# Patient Record
Sex: MALE | Race: WHITE | NOT HISPANIC OR LATINO
[De-identification: names, ages, dates, MRNs, and addresses within clinical notes are randomized per-mention and may not be internally consistent; named-entity substitution may affect disease eponyms.]

---
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
## 2021-08-19 ENCOUNTER — TRANSCRIPTION ENCOUNTER (OUTPATIENT)
Age: 21
End: 2021-08-19

## 2024-05-07 ENCOUNTER — EMERGENCY (EMERGENCY)
Facility: HOSPITAL | Age: 24
LOS: 1 days | Discharge: PSYCHIATRIC OR SUBSTANCE USE FACILITY | End: 2024-05-07
Attending: EMERGENCY MEDICINE | Admitting: EMERGENCY MEDICINE
Payer: COMMERCIAL

## 2024-05-07 VITALS
SYSTOLIC BLOOD PRESSURE: 146 MMHG | WEIGHT: 164.91 LBS | HEART RATE: 92 BPM | DIASTOLIC BLOOD PRESSURE: 86 MMHG | RESPIRATION RATE: 18 BRPM | TEMPERATURE: 98 F | OXYGEN SATURATION: 100 % | HEIGHT: 72 IN

## 2024-05-07 DIAGNOSIS — F31.9 BIPOLAR DISORDER, UNSPECIFIED: ICD-10-CM

## 2024-05-07 LAB
ALBUMIN SERPL ELPH-MCNC: 4.4 G/DL — SIGNIFICANT CHANGE UP (ref 3.3–5)
ALP SERPL-CCNC: 61 U/L — SIGNIFICANT CHANGE UP (ref 30–120)
ALT FLD-CCNC: 67 U/L — HIGH (ref 10–60)
AMPHET UR-MCNC: NEGATIVE — SIGNIFICANT CHANGE UP
ANION GAP SERPL CALC-SCNC: 8 MMOL/L — SIGNIFICANT CHANGE UP (ref 5–17)
APAP SERPL-MCNC: <1 UG/ML — LOW (ref 10–30)
AST SERPL-CCNC: 31 U/L — SIGNIFICANT CHANGE UP (ref 10–40)
BARBITURATES UR SCN-MCNC: NEGATIVE — SIGNIFICANT CHANGE UP
BASOPHILS # BLD AUTO: 0.03 K/UL — SIGNIFICANT CHANGE UP (ref 0–0.2)
BASOPHILS NFR BLD AUTO: 0.6 % — SIGNIFICANT CHANGE UP (ref 0–2)
BENZODIAZ UR-MCNC: NEGATIVE — SIGNIFICANT CHANGE UP
BILIRUB SERPL-MCNC: 0.9 MG/DL — SIGNIFICANT CHANGE UP (ref 0.2–1.2)
BUN SERPL-MCNC: 12 MG/DL — SIGNIFICANT CHANGE UP (ref 7–23)
CALCIUM SERPL-MCNC: 9.5 MG/DL — SIGNIFICANT CHANGE UP (ref 8.4–10.5)
CHLORIDE SERPL-SCNC: 105 MMOL/L — SIGNIFICANT CHANGE UP (ref 96–108)
CO2 SERPL-SCNC: 28 MMOL/L — SIGNIFICANT CHANGE UP (ref 22–31)
COCAINE METAB.OTHER UR-MCNC: NEGATIVE — SIGNIFICANT CHANGE UP
CREAT SERPL-MCNC: 1.04 MG/DL — SIGNIFICANT CHANGE UP (ref 0.5–1.3)
EGFR: 103 ML/MIN/1.73M2 — SIGNIFICANT CHANGE UP
EOSINOPHIL # BLD AUTO: 0.03 K/UL — SIGNIFICANT CHANGE UP (ref 0–0.5)
EOSINOPHIL NFR BLD AUTO: 0.6 % — SIGNIFICANT CHANGE UP (ref 0–6)
ETHANOL SERPL-MCNC: <3 MG/DL — SIGNIFICANT CHANGE UP (ref 0–3)
GLUCOSE SERPL-MCNC: 122 MG/DL — HIGH (ref 70–99)
HCT VFR BLD CALC: 43.3 % — SIGNIFICANT CHANGE UP (ref 39–50)
HGB BLD-MCNC: 14.3 G/DL — SIGNIFICANT CHANGE UP (ref 13–17)
IMM GRANULOCYTES NFR BLD AUTO: 0.2 % — SIGNIFICANT CHANGE UP (ref 0–0.9)
LYMPHOCYTES # BLD AUTO: 1.81 K/UL — SIGNIFICANT CHANGE UP (ref 1–3.3)
LYMPHOCYTES # BLD AUTO: 34.7 % — SIGNIFICANT CHANGE UP (ref 13–44)
MCHC RBC-ENTMCNC: 28.5 PG — SIGNIFICANT CHANGE UP (ref 27–34)
MCHC RBC-ENTMCNC: 33 GM/DL — SIGNIFICANT CHANGE UP (ref 32–36)
MCV RBC AUTO: 86.4 FL — SIGNIFICANT CHANGE UP (ref 80–100)
METHADONE UR-MCNC: NEGATIVE — SIGNIFICANT CHANGE UP
MONOCYTES # BLD AUTO: 0.47 K/UL — SIGNIFICANT CHANGE UP (ref 0–0.9)
MONOCYTES NFR BLD AUTO: 9 % — SIGNIFICANT CHANGE UP (ref 2–14)
NEUTROPHILS # BLD AUTO: 2.86 K/UL — SIGNIFICANT CHANGE UP (ref 1.8–7.4)
NEUTROPHILS NFR BLD AUTO: 54.9 % — SIGNIFICANT CHANGE UP (ref 43–77)
NRBC # BLD: 0 /100 WBCS — SIGNIFICANT CHANGE UP (ref 0–0)
OPIATES UR-MCNC: NEGATIVE — SIGNIFICANT CHANGE UP
PCP SPEC-MCNC: SIGNIFICANT CHANGE UP
PCP UR-MCNC: NEGATIVE — SIGNIFICANT CHANGE UP
PLATELET # BLD AUTO: 288 K/UL — SIGNIFICANT CHANGE UP (ref 150–400)
POTASSIUM SERPL-MCNC: 3.8 MMOL/L — SIGNIFICANT CHANGE UP (ref 3.5–5.3)
POTASSIUM SERPL-SCNC: 3.8 MMOL/L — SIGNIFICANT CHANGE UP (ref 3.5–5.3)
PROT SERPL-MCNC: 7.5 G/DL — SIGNIFICANT CHANGE UP (ref 6–8.3)
RBC # BLD: 5.01 M/UL — SIGNIFICANT CHANGE UP (ref 4.2–5.8)
RBC # FLD: 12.3 % — SIGNIFICANT CHANGE UP (ref 10.3–14.5)
SALICYLATES SERPL-MCNC: <0.2 MG/DL — LOW (ref 2.8–20)
SARS-COV-2 RNA SPEC QL NAA+PROBE: SIGNIFICANT CHANGE UP
SODIUM SERPL-SCNC: 141 MMOL/L — SIGNIFICANT CHANGE UP (ref 135–145)
THC UR QL: NEGATIVE — SIGNIFICANT CHANGE UP
WBC # BLD: 5.21 K/UL — SIGNIFICANT CHANGE UP (ref 3.8–10.5)
WBC # FLD AUTO: 5.21 K/UL — SIGNIFICANT CHANGE UP (ref 3.8–10.5)

## 2024-05-07 PROCEDURE — 85025 COMPLETE CBC W/AUTO DIFF WBC: CPT

## 2024-05-07 PROCEDURE — 80053 COMPREHEN METABOLIC PANEL: CPT

## 2024-05-07 PROCEDURE — 93005 ELECTROCARDIOGRAM TRACING: CPT

## 2024-05-07 PROCEDURE — 99285 EMERGENCY DEPT VISIT HI MDM: CPT | Mod: 25

## 2024-05-07 PROCEDURE — 80307 DRUG TEST PRSMV CHEM ANLYZR: CPT

## 2024-05-07 PROCEDURE — 90792 PSYCH DIAG EVAL W/MED SRVCS: CPT | Mod: 95

## 2024-05-07 PROCEDURE — 93010 ELECTROCARDIOGRAM REPORT: CPT

## 2024-05-07 PROCEDURE — 87635 SARS-COV-2 COVID-19 AMP PRB: CPT

## 2024-05-07 PROCEDURE — 99285 EMERGENCY DEPT VISIT HI MDM: CPT

## 2024-05-07 PROCEDURE — 36415 COLL VENOUS BLD VENIPUNCTURE: CPT

## 2024-05-07 RX ORDER — OLANZAPINE 15 MG/1
5 TABLET, FILM COATED ORAL ONCE
Refills: 0 | Status: COMPLETED | OUTPATIENT
Start: 2024-05-07 | End: 2024-05-07

## 2024-05-07 RX ADMIN — OLANZAPINE 5 MILLIGRAM(S): 15 TABLET, FILM COATED ORAL at 20:04

## 2024-05-07 NOTE — ED BEHAVIORAL HEALTH ASSESSMENT NOTE - HPI (INCLUDE ILLNESS QUALITY, SEVERITY, DURATION, TIMING, CONTEXT, MODIFYING FACTORS, ASSOCIATED SIGNS AND SYMPTOMS)
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     Patient was notably pressured on exam though interruptible and in behavioral control, demonstrating some insight into his condition.    He states that for the last week hes been sleeping 1-3 hours per night, has been full of energy during the day, has felt like hes been on cloud 9, has felt the happiest hes ever been, saying that everything has been going right. He endorses increased speech, increase in reckless spending (bought knicks tickets for himself and friend, new shoes and clothes that are out of character to him), with racing thoughts, last night decided that he had "broken money" and figured out how rich people got rich, had to wake his gf up at 2am in order to explain it to her. He denies suicide ideation and HI. Does endorse possible ideas of reference (though gave example of seeing connections between things, saying that he saw a small rainbow yesterday and it was very meaningful to him). Denies auditory, visual, or tactile hallucinations, states that he feels that his "consciousness has upgraded". However he states that while he has felt great, he has an understanding that this is ultimately not healthy for him, and he and gf looked up marine symptoms last night and he feels that this describes his experience. He has an uncle with schizophrenia, denies other family history. He states that he will do whatever the doctors feel is necessary for him, agreed to come into the hospital and start medication.     Collateral obtained from gf - she states that they've been dating since , she knows him very well, and that starting last Monday he had a significant change in personality such that he was unrecognizable. She states that he became very talkative, overly talkative, that everything he did was ground breaking. She didn't think anything of it for a couple of days but it has gotten worse, hes been sleeping an hour per night, last night went to a [employer] game, woke her up at 2am to explain that theyd never have to worry about money again because he figured out how money works. She states that his coworkers have been telling her that theyre worried about him. He wants to write an autobiography, sell t shirts, get a doctorate, wrote notes about what his dissertation should be. She worries about bringing him home.

## 2024-05-07 NOTE — ED ADULT NURSE REASSESSMENT NOTE - NS ED NURSE REASSESS COMMENT FT1
spoke with tele psych, Pt is going to [facility], tried to call to give report @[phone], was told the nurse is busy getting report now, will call back

## 2024-05-07 NOTE — ED PROVIDER NOTE - CLINICAL SUMMARY MEDICAL DECISION MAKING FREE TEXT BOX
Patient brought in by his girlfriend for psychiatric evaluation possible manic episode.  Patient relates everything is going well and his wife and he has been "winning" for the past few years and that is why he is in the ER to be checked.  Patient reports no prior psychiatric treatment or diagnosis however he does have a maternal uncle with schizophrenia.  Patient's girlfriend who he lives with relates that patient had a significant change in his behavior the past week has seemed manic to her, and she relates that multiple coworkers of his pulled him aside asking if he was okay saying that he did not seem to be himself.  Patient denies depression or suicidal homicidal ideation or hallucinations.    Plan EKG labs telepsychiatry evaluation    Differential including but not limited to manic episode joint abnormality substance abuse

## 2024-05-07 NOTE — ED BEHAVIORAL HEALTH ASSESSMENT NOTE - DIFFERENTIAL
(1) body pink, extremities blue bipolar, r/o schizoaffective, schizophrenia, mood disorder secondary to unknown medical condition

## 2024-05-07 NOTE — ED ADULT NURSE REASSESSMENT NOTE - NS ED NURSE REASSESS COMMENT FT1
EMS stated that they are BLS and can not take this pt because he was given Zyprexa 5mg oral, will dispatch a [facility] ambulance,now ETA around [phone]

## 2024-05-07 NOTE — ED BEHAVIORAL HEALTH ASSESSMENT NOTE - DETAILS
Denies Uncle with schizophrenia Would no impact clinical decisionmaking at this time Discussed w Dr. Demarco Discussed with gf

## 2024-05-07 NOTE — ED PROVIDER NOTE - DIFFERENTIAL DIAGNOSIS
Differential including but not limited to manic episode joint abnormality substance abuse Differential Diagnosis

## 2024-05-07 NOTE — ED PROVIDER NOTE - PROGRESS NOTE DETAILS
Called telepsych Discussed with Dr. Escobar (telepsych attending) he will see pt Dr. Escobar (attending telepsychiatrist) requesting Zyprexa 5 mg and relates patient to be admitted voluntary status to [facility] Telepsych relates patient to be transferred to [facility] under Dr. Green's service voluntary status

## 2024-05-07 NOTE — ED CLERICAL - CLERICAL COMMENTS
called [facility] ems for transport to [facility] at 2030. called [facility] ems for transport to [facility] at 2030. [facility] ems came  @ [phone].and they have to send another crew called [facility] at [phone] for eta of new crew.  spoke to favio and the eta is approx 2330.

## 2024-05-07 NOTE — ED BEHAVIORAL HEALTH ASSESSMENT NOTE - RISK ASSESSMENT
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 protective factors include lack of prior attempts, lack of violence, stable housing, supportive family, engagement in work, sobriety

## 2024-05-07 NOTE — ED ADULT NURSE NOTE - OBJECTIVE STATEMENT
25 y/o male received axo4 ambulatory bibgirlfriend requesting psychiatric eval. patient reports that lately he had been feeling quite high and euphoric, described himself as "being in cloud 9 lately". also admits to mild manic episodes and associated with lack of sleep. pt is currently calm and cooperative, denies si/hi, pt denies any psych history but does report that there is psychiatric history in family.

## 2024-05-07 NOTE — ED BEHAVIORAL HEALTH ASSESSMENT NOTE - SUMMARY
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    Patient appears to be experiencing his first manic episode as he endorses/displays decrease need for sleep, elevated mood, increase in speech, increase in impulsive behavior, increase in goal directed behavior, racing thoughts, leading to coworkers and gf to become concerned. He is amenable to inpatient hospitalization and would benefit from safety, stabilization, medication titration.

## 2024-05-07 NOTE — ED BEHAVIORAL HEALTH ASSESSMENT NOTE - PSYCHIATRIC ISSUES AND PLAN (INCLUDE STANDING AND PRN MEDICATION)
Start zyprexa 5mg PO qHS (discussed risks and benefits with him). For agitation, haldol 5mg with ativan 2mg and benadryl 50mg PO or IM if severe

## 2024-05-07 NOTE — ED PROVIDER NOTE - OBJECTIVE STATEMENT
Patient brought in by his girlfriend for psychiatric evaluation possible manic episode.  Patient relates everything is going well and his wife and he has been "winning" for the past few years and that is why he is in the ER to be checked.  Patient reports no prior psychiatric treatment or diagnosis however he does have a maternal uncle with schizophrenia.  Patient's girlfriend who he lives with relates that patient had a significant change in his behavior the past week has seemed manic to her, and she relates that multiple coworkers of his pulled him aside asking if he was okay saying that he did not seem to be himself.  Patient denies depression or suicidal homicidal ideation or hallucinations.

## 2024-05-07 NOTE — ED ADULT NURSE NOTE - HISTORY OF COVID-19 VACCINATION
VITAL SIGNS: I have reviewed nursing notes and confirm.  CONSTITUTIONAL: Well-developed; well-nourished; appears uncomfortably due to pain.   SKIN: Skin exam is warm and dry, no acute rash.  HEAD: Normocephalic; atraumatic.  EYES: PERRL, EOM intact; conjunctiva and sclera clear.  ENT: No nasal discharge; airway clear.   NECK: Supple; non tender.  CARD: S1, S2 normal; no murmurs, gallops, or rubs. Regular rate and rhythm.  RESP: Clear to auscultation bilaterally. No wheezes, rales or rhonchi.  ABD: Normal bowel sounds; soft; non-distended; non-tender.   EXT: Normal ROM. No edema.  LYMPH: No acute cervical adenopathy.  NEURO: Alert, oriented. Grossly unremarkable. No focal deficits. No meningeal signs.   PSYCH: Cooperative, appropriate.
Yes

## 2024-05-08 ENCOUNTER — INPATIENT (INPATIENT)
Facility: HOSPITAL | Age: 24
LOS: 1 days | Discharge: HOME | End: 2024-05-10
Attending: PSYCHIATRY & NEUROLOGY | Admitting: PSYCHIATRY & NEUROLOGY
Payer: COMMERCIAL

## 2024-05-08 VITALS
TEMPERATURE: 98 F | DIASTOLIC BLOOD PRESSURE: 72 MMHG | HEART RATE: 92 BPM | SYSTOLIC BLOOD PRESSURE: 112 MMHG | OXYGEN SATURATION: 97 % | RESPIRATION RATE: 16 BRPM

## 2024-05-08 VITALS
DIASTOLIC BLOOD PRESSURE: 88 MMHG | HEART RATE: 60 BPM | TEMPERATURE: 99 F | RESPIRATION RATE: 17 BRPM | OXYGEN SATURATION: 99 % | SYSTOLIC BLOOD PRESSURE: 138 MMHG

## 2024-05-08 DIAGNOSIS — F31.9 BIPOLAR DISORDER, UNSPECIFIED: ICD-10-CM

## 2024-05-08 LAB
T4 AB SER-ACNC: 6.79 UG/DL — SIGNIFICANT CHANGE UP (ref 4.5–11.7)
TSH SERPL-MCNC: 1.94 UIU/ML — SIGNIFICANT CHANGE UP (ref 0.27–4.2)

## 2024-05-08 PROCEDURE — 99223 1ST HOSP IP/OBS HIGH 75: CPT

## 2024-05-08 RX ORDER — IBUPROFEN 200 MG
400 TABLET ORAL EVERY 6 HOURS
Refills: 0 | Status: DISCONTINUED | OUTPATIENT
Start: 2024-05-08 | End: 2024-05-10

## 2024-05-08 RX ORDER — OLANZAPINE 15 MG/1
5 TABLET, FILM COATED ORAL AT BEDTIME
Refills: 0 | Status: DISCONTINUED | OUTPATIENT
Start: 2024-05-08 | End: 2024-05-09

## 2024-05-08 RX ORDER — LITHIUM CARBONATE 300 MG/1
300 TABLET, EXTENDED RELEASE ORAL DAILY
Refills: 0 | Status: DISCONTINUED | OUTPATIENT
Start: 2024-05-08 | End: 2024-05-10

## 2024-05-08 RX ORDER — LITHIUM CARBONATE 300 MG/1
600 TABLET, EXTENDED RELEASE ORAL AT BEDTIME
Refills: 0 | Status: DISCONTINUED | OUTPATIENT
Start: 2024-05-08 | End: 2024-05-10

## 2024-05-08 RX ORDER — TRAZODONE HCL 50 MG
50 TABLET ORAL AT BEDTIME
Refills: 0 | Status: DISCONTINUED | OUTPATIENT
Start: 2024-05-08 | End: 2024-05-10

## 2024-05-08 RX ORDER — OLANZAPINE 15 MG/1
5 TABLET, FILM COATED ORAL EVERY 8 HOURS
Refills: 0 | Status: DISCONTINUED | OUTPATIENT
Start: 2024-05-08 | End: 2024-05-10

## 2024-05-08 RX ADMIN — OLANZAPINE 5 MILLIGRAM(S): 15 TABLET, FILM COATED ORAL at 21:53

## 2024-05-08 RX ADMIN — LITHIUM CARBONATE 600 MILLIGRAM(S): 300 TABLET, EXTENDED RELEASE ORAL at 21:53

## 2024-05-08 NOTE — BH INPATIENT PSYCHIATRY ASSESSMENT NOTE - RISK ASSESSMENT
Pt is at low risk for aggression and suicide. His mood symptoms could predispose him to aggression, however, he has good insight to his condition and is cooperative.   Pt is at low acute risk for aggression and suicide. His mood symptoms could predispose him to aggression, however, he has good insight to his condition and is cooperative. Denying any suicidal/homicidal ideation and has no history of aggressive, self-harm, or suicidal behaviors in the past. He has numerous strong protective factors including stable housing/employment and very strong support system.

## 2024-05-08 NOTE — BH INPATIENT PSYCHIATRY ASSESSMENT NOTE - CURRENT MEDICATION
MEDICATIONS  (STANDING):  lithium 300 milliGRAM(s) Oral daily  lithium 600 milliGRAM(s) Oral at bedtime  OLANZapine 5 milliGRAM(s) Oral at bedtime    MEDICATIONS  (PRN):  ibuprofen  Tablet. 400 milliGRAM(s) Oral every 6 hours PRN Moderate Pain (4 - 6)  OLANZapine 5 milliGRAM(s) Oral every 8 hours PRN agitation  traZODone 50 milliGRAM(s) Oral at bedtime PRN insomnia   MEDICATIONS  (STANDING):  lithium 600 milliGRAM(s) Oral at bedtime  lithium 300 milliGRAM(s) Oral daily  OLANZapine 5 milliGRAM(s) Oral at bedtime    MEDICATIONS  (PRN):  ibuprofen  Tablet. 400 milliGRAM(s) Oral every 6 hours PRN Moderate Pain (4 - 6)  OLANZapine 5 milliGRAM(s) Oral every 8 hours PRN agitation  traZODone 50 milliGRAM(s) Oral at bedtime PRN insomnia   MEDICATIONS  (STANDING):  lithium 300 milliGRAM(s) Oral daily  lithium 600 milliGRAM(s) Oral at bedtime  OLANZapine 7.5 milliGRAM(s) Oral at bedtime    MEDICATIONS  (PRN):  ibuprofen  Tablet. 400 milliGRAM(s) Oral every 6 hours PRN Moderate Pain (4 - 6)  OLANZapine 5 milliGRAM(s) Oral every 8 hours PRN agitation  traZODone 50 milliGRAM(s) Oral at bedtime PRN insomnia

## 2024-05-08 NOTE — BH INPATIENT PSYCHIATRY ASSESSMENT NOTE - NSSUICPROTFACT_PSY_ALL_CORE
Identifies reasons for living/Supportive social network of family or friends/Engaged in work or school Identifies reasons for living/Supportive social network of family or friends/Fear of death or the actual act of killing self/Cultural, spiritual and/or moral attitudes against suicide/Engaged in work or school/Ability to cope with stress

## 2024-05-08 NOTE — BH INPATIENT PSYCHIATRY ASSESSMENT NOTE - NSBHCHARTREVIEWVS_PSY_A_CORE FT
Vital Signs Last 24 Hrs  T(C): 37 (05-08-24 @ 08:59), Max: 37.1 (05-08-24 @ 05:15)  T(F): 98.6 (05-08-24 @ 08:59), Max: 98.8 (05-08-24 @ 05:15)  HR: 70 (05-08-24 @ 08:59) (60 - 92)  BP: 143/87 (05-08-24 @ 08:59) (112/72 - 154/87)  BP(mean): --  RR: 17 (05-08-24 @ 05:15) (16 - 17)  SpO2: 99% (05-08-24 @ 08:59) (97% - 99%)     Vital Signs Last 24 Hrs  T(C): 37.2 (05-10-24 @ 08:56), Max: 37.2 (05-10-24 @ 08:56)  T(F): 99 (05-10-24 @ 08:56), Max: 99 (05-10-24 @ 08:56)  HR: 71 (05-10-24 @ 08:56) (71 - 71)  BP: 131/83 (05-10-24 @ 08:56) (131/83 - 131/83)  BP(mean): --  RR: 18 (05-10-24 @ 08:56) (18 - 18)  SpO2: 100% (05-10-24 @ 08:56) (100% - 100%)

## 2024-05-08 NOTE — BH SOCIAL WORK INITIAL PSYCHOSOCIAL EVALUATION - OTHER PAST PSYCHIATRIC HISTORY (INCLUDE DETAILS REGARDING ONSET, COURSE OF ILLNESS, INPATIENT/OUTPATIENT TREATMENT)
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

## 2024-05-08 NOTE — BH INPATIENT PSYCHIATRY ASSESSMENT NOTE - NSDCCRITERIA_PSY_ALL_CORE
Decrease in manic symptoms and return to baseline Decrease in manic symptoms, ability to maintain normal functioning in community

## 2024-05-08 NOTE — BH INPATIENT PSYCHIATRY ASSESSMENT NOTE - OTHER
Aware of abnormality of his elevated mood, willing to get treatment Increased rate, interruptible Increased rate and productivity, near-pressured but interruptible

## 2024-05-08 NOTE — BH INPATIENT PSYCHIATRY ASSESSMENT NOTE - NSBHPHYSICALEXAM_PSY_ALL_CORE
General: Alert, oriented, in no acute distress  Respiratory: CTAB  Cardiac: Normal S1/S2, no extra heart sounds, no murmurs  Abdominal: Normal bowel sounds, abd is soft, nontender, nondistended  Extremities: No LE edema, no tenderness to palpation  Neuro: CN II-XII intact, normal gait

## 2024-05-08 NOTE — BH INPATIENT PSYCHIATRY ASSESSMENT NOTE - NSBHATTESTCOMMENTATTENDFT_PSY_A_CORE
Pt hypomanic vs. manic. Rapid but not pressured speech. Poor judgment. Elevated mood and circumstantial. Grandiose. Starting lithium in addition to zyprexa. Encouraging pt to stay until next week but pt will likely put in a 72 hour letter. Pt gave consent to speak with his girlfriend and his parents. He has a schizophrenic uncle. -SI/HI/IRMA/KVNG/PI.

## 2024-05-08 NOTE — BH INPATIENT PSYCHIATRY ASSESSMENT NOTE - DESCRIPTION
Pt is a 24-year-old male, who was born and grew up in [place]. He lives with his girlfriend Torie Matthew. 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 He drinks socially. No other substance use reported.

## 2024-05-08 NOTE — BH INPATIENT PSYCHIATRY ASSESSMENT NOTE - NSBHMETABOLIC_PSY_ALL_CORE_FT
BMI: BMI (kg/m2): 22.4 (05-07-24 @ 13:28)  HbA1c:   Glucose:   BP: 143/87 (05-08-24 @ 08:59) (138/88 - 143/87)Vital Signs Last 24 Hrs  T(C): 37 (05-08-24 @ 08:59), Max: 37.1 (05-08-24 @ 05:15)  T(F): 98.6 (05-08-24 @ 08:59), Max: 98.8 (05-08-24 @ 05:15)  HR: 70 (05-08-24 @ 08:59) (60 - 92)  BP: 143/87 (05-08-24 @ 08:59) (112/72 - 154/87)  BP(mean): --  RR: 17 (05-08-24 @ 05:15) (16 - 17)  SpO2: 99% (05-08-24 @ 08:59) (97% - 99%)      Lipid Panel:  BMI: BMI (kg/m2): 22.4 (05-07-24 @ 13:28)  HbA1c: A1C with Estimated Average Glucose Result: 5.5 % (05-09-24 @ 05:30)    Glucose:   BP: 131/83 (05-10-24 @ 08:56) (126/89 - 146/84)Vital Signs Last 24 Hrs  T(C): 37.2 (05-10-24 @ 08:56), Max: 37.2 (05-10-24 @ 08:56)  T(F): 99 (05-10-24 @ 08:56), Max: 99 (05-10-24 @ 08:56)  HR: 71 (05-10-24 @ 08:56) (71 - 71)  BP: 131/83 (05-10-24 @ 08:56) (131/83 - 131/83)  BP(mean): --  RR: 18 (05-10-24 @ 08:56) (18 - 18)  SpO2: 100% (05-10-24 @ 08:56) (100% - 100%)      Lipid Panel: Date/Time: 05-09-24 @ 05:30  Cholesterol, Serum: 165  LDL Cholesterol Calculated: 74  HDL Cholesterol, Serum: 79  Total Cholesterol/HDL Ration Measurement: --  Triglycerides, Serum: 63

## 2024-05-08 NOTE — BH PATIENT PROFILE - NSBHATTITUDE_PSY_A_CORE
Good hand washing  Wash wash cloths, towels, and sheets  Contagiousness discussed  Diarrhea, sun sensitivity from abx   cooperative

## 2024-05-08 NOTE — BH INPATIENT PSYCHIATRY ASSESSMENT NOTE - NSACTIVEVENT_PSY_ALL_CORE
Oxymetazoline (Afrin) nasal spray for 5 days followed by nasal steroid.    Recent onset of psychiatric illness

## 2024-05-08 NOTE — BH PATIENT PROFILE - FALL HARM RISK - UNIVERSAL INTERVENTIONS
Bed in lowest position, wheels locked, appropriate side rails in place/Call bell, personal items and telephone in reach/Instruct patient to call for assistance before getting out of bed or chair/Non-slip footwear when patient is out of bed/[place] to call system/Physically safe environment - no spills, clutter or unnecessary equipment/Purposeful Proactive Rounding/Room/bathroom lighting operational, light cord in reach

## 2024-05-08 NOTE — BH INPATIENT PSYCHIATRY ASSESSMENT NOTE - NSCOMMENTSUICRISKFACT_PSY_ALL_CORE
Patient denies SI but his current psychiatric condition, impulsive behavior, and FH of schizophrenia could act as risk factors Patient denies SI but his current manic symptoms and impulsivity could act as risk factors

## 2024-05-08 NOTE — BH INPATIENT PSYCHIATRY ASSESSMENT NOTE - OTHER PAST PSYCHIATRIC HISTORY (INCLUDE DETAILS REGARDING ONSET, COURSE OF ILLNESS, INPATIENT/OUTPATIENT TREATMENT)
No prior manic or depressive episodes. Patient went to therapy for a few times due to family conflict but otherwise has not received any psychiatric treatment.

## 2024-05-08 NOTE — BH INPATIENT PSYCHIATRY ASSESSMENT NOTE - HPI (INCLUDE ILLNESS QUALITY, SEVERITY, DURATION, TIMING, CONTEXT, MODIFYING FACTORS, ASSOCIATED SIGNS AND SYMPTOMS)
A 25yo  male, domiciled with girlfriend in , employed as HS teacher, with no known PMH, no PPH, no history of substance use reported to [facility] ED for elevated mood, sleeplessness, and significant change in behavior, where he was given Zyprexa 5 mg, with negative Utox, and was transferred to [facility] for further evaluation and treatment.     Upon approach patient was pleasant, cooperative, and talkative. His main complaint is his unusually elevated mood, lack of sleep, willingness to complete projects, and impulsive actions like buying tickets to a [employer] game for the past 2 weeks. The patient noticed a change in his mood about 2 months ago, and gradually he has been getting more productive, until two weeks ago, when he noticed he was not needing sleep. The patient has slept less than 4 hours since last Monday and recalls waking his girlfriend up and calling his parents to talk about his ideas at 1 am. His speech is highly productive, interruptible, of normal tone and volume. He describes having increased levels of energy, feeling like he is the best teacher at his work, taking on more responsibilities at work, and completing all his projects on time. He reported that this is his first time feeling "this great". He mentioned having a depressed mood in the fall, when he had just started working as a . He described feeling like he was not good enough for the job and struggled adjusting it, but did not seek psychiatric help or medications. Pt has an uncle with schizophrenia. Pt denies AVH, SI, HI.    Collateral: Stephen, patient's father and mother, reached at [phone] and seen in person.  Parents confirmed the patient's current symptoms and presentation. They described his baseline as a pleasant, caring, liked-by-everyone individual, who plays sports and has future-oriented goals of getting engaged. They live close by and see the patient 1-2x a week. They reported that the patient's behavior has been concerning, he would steer the wheel with one hand and try to talk to his grandma on the phone. He is extremely active and has been calling them in the middle of the night. The mother stated that she has stage 4 cancer and the girlfriend's mother also has cancer, which made it stressful for him. Both the mother and the girlfriend agreed that the patient could be having manic symptoms and brought him to the ED. 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 ED for elevated mood, decreased sleep, racing thoughts, flight of ideas, grandiose delusions, and significant change in behavior. Utox negative. He was given one dose of Zyprexa 5 mg and was transferred to [facility] for further evaluation and treatment.     Upon approach, patient was pleasant, cooperative, and talkative. He was sitting in his room, multiple pages filled with his writing around him. His main complaint at this time is his unusually elevated mood/energy, lack of sleep, hyperproductivity, and impulsive actions (ex: buying tickets to a [employer] game day of) for the past few weeks. The patient noticed a gradual change in his mood starting about 2 months ago, with more abrupt, drastic increase in symptoms beginning 2 weeks ago. He reports getting <5 hours of sleep/night since last Monday (recalls waking his girlfriend up and calling his parents to talk about his ideas at 1 am). He describes having increased levels of energy, feeling like he is the best teacher at his work, taking on more responsibilities at work, and completing all his projects on time ("This is the best I've ever been at my job"). Reported consistently elevated mood over the past two weeks. Denies any history of AVH, SI, HI. Denied any history of substance use outside of occasional recreational alcohol use and nicotine in HS/college.    Patient stated this was his first time seeking psychiatric treatment and that he has never been on psychiatric medications or received an official diagnosis. He mentioned feeling somewhat depressed and anxious in the fall, when he had just started working as a . He described feeling like he was not good enough for the job and had a difficult time adjusting, often questioning if he was doing the right thing. Denied any history of manic symptoms prior to this episode. Reported having a maternal uncle with schizophrenia. Denied having any medical conditions, being on any medications prior to admission, or having any known drug allergies. Denied having any legal history.    Collateral was obtained from Stephen (patient's father and mother, [phone]).  Parents confirmed the patient's current symptoms and presentation. They described his baseline as a pleasant, caring, charismatic, outgoing individual, who plays sports and has future-oriented goals of getting engaged. They live close by and see the patient 1-2x a week. They reported that for the past few weeks, the patient's behavior has been noticeably different from usual (talking more, being overly ambitious). He also has been calling them in the middle of the night. However he has not engaged in any highly risky behavior to their knowledge. Also denied knowledge of any substance use.     When asked about potential stressors, mother reported that she has stage 4 cancer; patient's girlfriend's mother (who patient is very close to) was also diagnosed with cancer around the same time. 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 Said that patient has seen a psychologist due to distress over family conflicts but otherwise received no formal diagnosis.     Counseling around patient's likely diagnosis of bipolar disorder, prognosis, and treatment options were provided to the family.  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 Utox negative. He was given one dose of Zyprexa 5 mg and was transferred to [facility] for further evaluation and treatment.     Upon approach, patient was pleasant, cooperative, and talkative. He was sitting in his room, multiple pages filled with his writing around him. His main complaint at this time is his unusually elevated mood/energy, lack of sleep, hyperproductivity, and impulsive actions (ex: buying tickets to a [employer] game day of) for the past few weeks. The patient noticed a gradual change in his mood starting about 2 months ago, with more abrupt, drastic increase in symptoms beginning 2 weeks ago. He reports getting <5 hours of sleep/night since last Monday (recalls waking his girlfriend up and calling his parents to talk about his ideas at 1 am). He describes having increased levels of energy, feeling like he is the best teacher at his work, taking on more responsibilities at work, and completing all his projects on time ("This is the best I've ever been at my job"). Reported consistently elevated mood over the past two weeks. Denies any history of AVH, SI, HI. Denied any history of substance use outside of occasional recreational alcohol use and nicotine in HS/college. Patient stated he would be willing to comply with treatment and do "whatever it takes to get better"; however he would like to be discharged as soon as possible and has submitted a 72-hour letter.     Patient stated this was his first time seeking psychiatric treatment and that he has never been on psychiatric medications or received an official diagnosis. He mentioned feeling somewhat depressed and anxious in the fall, when he had just started working as a . He described feeling like he was not good enough for the job and had a difficult time adjusting, often questioning if he was doing the right thing. Denied any history of manic symptoms prior to this episode. Reported having a maternal uncle with schizophrenia. Denied having any medical conditions, being on any medications prior to admission, or having any known drug allergies. Denied having any legal history.    Collateral was obtained from Stephen (patient's father and mother, [phone]).  Parents confirmed the patient's current symptoms and presentation. They described his baseline as a pleasant, caring, charismatic, outgoing individual, who plays sports and has future-oriented goals of getting engaged. They live close by and see the patient 1-2x a week. They reported that for the past few weeks, the patient's behavior has been noticeably different from usual (talking more, being overly ambitious). He also has been calling them in the middle of the night. However he has not engaged in any highly risky behavior to their knowledge. Also denied knowledge of any substance use.     When asked about potential stressors, mother reported that she has stage 4 cancer; patient's girlfriend's mother (who patient is very close to) was also diagnosed with cancer around the same time. 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 Said that patient has seen a psychologist due to distress over family conflicts but otherwise received no formal diagnosis.     Counseling around patient's likely diagnosis of bipolar disorder, prognosis, and treatment options were provided to the family.

## 2024-05-08 NOTE — SOCIAL WORK PROGRESS NOTE - NSSWPROGRESSNOTE_GEN_ALL_CORE
SOCO notified via tele psych email to seek precert for pt's inpt MH services at [facility] form 5/8 @ 4:30 am. SOCO spoke with Jenny  @ [facility]  in precert dept and obtained pending reference number of [id number],  UR is [facility] POOL  ext . SOCO left detailed clinical on voicemail per HIPPA compliance for [facility] and provided both sending hospital  [phone] and receiving hospital UR contact number . [facility] notified of above via tele psych email.  SOCO notified via tele psych email to seek pre certification for pt's inpt MH services at [facility] starting 5/8 @ 4:30 am. SOCO spoke with Jenny  @ [facility]  in pre certification dept and obtained pending reference number of [id number],  UR is [facility] POOL  ext . SOCO left detailed clinical on voicemail per HIPAA compliance for [facility] and provided both sending hospital  [phone] and receiving hospital UR contact number . [facility] notified of above via tele psych email.

## 2024-05-08 NOTE — BH INPATIENT PSYCHIATRY ASSESSMENT NOTE - NSBHATTESTTYPEVISIT_PSY_A_CORE
Addended by: JABARI RICKETTS on: 8/1/2022 09:04 PM     Modules accepted: Level of Service     Attending with Resident/Fellow/Student

## 2024-05-08 NOTE — BH INPATIENT PSYCHIATRY ASSESSMENT NOTE - DETAILS
Uncle has schizophrenia   Father drinks alcohol Uncle has schizophrenia   Grandfather has history of alcoholism

## 2024-05-08 NOTE — BH INPATIENT PSYCHIATRY ASSESSMENT NOTE - NSBHASSESSSUMMFT_PSY_ALL_CORE
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     He is displaying elevated mood, grandiose ideation, impulsive behavior, flight of ideas, and complains of getting less than 4 hours of sleep since last Monday. He denies previous episodes of marine or hypomania. He recalls feeling depressed in the fall but denies taking any psychiatric medications. No substance use reported. He denies SI/HI/AVH.     Impression - Pt is in the borderline of a manic or a hypomanic episode without psychosis. The acute onset of manic symptoms without precipitating factors makes Bipolar I Disorder, marine, first episode vs Bipolar II disorder, hypomania, first episode as the likely diagnosis.     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 He is displaying elevated mood, grandiose ideation, impulsive behavior, flight of ideas, and complains of getting less than 5 hours of sleep per night since last Monday. He denies previous episodes of marine or hypomania. He recalls feeling depressed in the fall but denies taking any psychiatric medications. No substance use reported. He denies SI/HI/AVH.     Impression - likely manic episode without psychosis. Substance use does not appear to be a contributing factor at this time. Although patient was displaying some signs of having grandiose delusions in the ED, currently is not exhibiting delusional thought process. No overt psychotic symptoms were observed on initial evaluation; his mood symptoms appear to be much more predominant, suggesting a diagnosis of bipolar I disorder rather than psychotic spectrum disorder at this time.     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 He is displaying elevated mood, grandiose ideation, impulsive behavior, flight of ideas, and complains of getting less than 5 hours of sleep per night since last Monday. He denies previous episodes of marine or hypomania. He recalls feeling depressed in the fall but denies taking any psychiatric medications. No substance use reported. He denies SI/HI/AVH.     Impression - likely manic episode without psychosis. Substance use does not appear to be a contributing factor at this time. Although patient was displaying some signs of having grandiose delusions in the ED, currently is not exhibiting delusional thought process. No overt psychotic symptoms were observed on initial evaluation; his mood symptoms appear to be much more predominant, suggesting a diagnosis of bipolar I disorder rather than psychotic spectrum disorder at this time.     Plan:  - Start Lithium 300 mg PO in the morning and 600 mg PO at bedtime  - Zyprexa 5mg at bedtime for sleep  - PRN: PO Zyprexa 5 mg and Ativan 2 mg for agitation and anxiety, Trazodone 50 mg for insomnia, Ibuprofen 400 mg for pain

## 2024-05-08 NOTE — BH INPATIENT PSYCHIATRY ASSESSMENT NOTE - NSTXMANICGOAL_PSY_ALL_CORE
Be able to identify the early signs of marine (e.g. sleep and mood changes) and to employ coping strategies to minimize acting out

## 2024-05-08 NOTE — BH PATIENT PROFILE - REASON FOR REFUSAL (REFER PATIENT TO HEALTHCARE PROVIDER FOR FOLLOW-UP):
Laceration    A laceration is a cut that goes through all of the layers of the skin and into the tissue that is right under the skin. Some lacerations heal on their own. Others need to be closed with skin adhesive strips, skin glue, stitches (sutures), or staples. Proper laceration care minimizes the risk of infection and helps the laceration to heal better.  If non-absorbable stitches or staples have been placed, they must be taken out within the time frame instructed by your healthcare provider.    Please keep bandage in place for 24 hours. After 24 hours, you can remove the bandage and gently wash the area with warm soapy water then replace a clean bandage. Repeat daily until follow up for suture removal. Return in 7 days for suture removal.     SEEK IMMEDIATE MEDICAL CARE IF YOU HAVE ANY OF THE FOLLOWING SYMPTOMS: swelling around the wound, worsening pain, drainage from the wound, red streaking going away from your wound, inability to move finger or toe near the laceration, or discoloration of skin near the laceration. does not want at this time

## 2024-05-09 LAB
A1C WITH ESTIMATED AVERAGE GLUCOSE RESULT: 5.5 % — SIGNIFICANT CHANGE UP (ref 4–5.6)
CHOLEST SERPL-MCNC: 165 MG/DL — SIGNIFICANT CHANGE UP
ESTIMATED AVERAGE GLUCOSE: 111 MG/DL — SIGNIFICANT CHANGE UP (ref 68–114)
HDLC SERPL-MCNC: 79 MG/DL — SIGNIFICANT CHANGE UP
LIPID PNL WITH DIRECT LDL SERPL: 74 MG/DL — SIGNIFICANT CHANGE UP
NON HDL CHOLESTEROL: 86 MG/DL — SIGNIFICANT CHANGE UP
TRIGL SERPL-MCNC: 63 MG/DL — SIGNIFICANT CHANGE UP

## 2024-05-09 PROCEDURE — 99233 SBSQ HOSP IP/OBS HIGH 50: CPT

## 2024-05-09 RX ORDER — OLANZAPINE 15 MG/1
7.5 TABLET, FILM COATED ORAL AT BEDTIME
Refills: 0 | Status: DISCONTINUED | OUTPATIENT
Start: 2024-05-09 | End: 2024-05-10

## 2024-05-09 RX ADMIN — LITHIUM CARBONATE 600 MILLIGRAM(S): 300 TABLET, EXTENDED RELEASE ORAL at 21:05

## 2024-05-09 RX ADMIN — OLANZAPINE 7.5 MILLIGRAM(S): 15 TABLET, FILM COATED ORAL at 21:05

## 2024-05-09 RX ADMIN — LITHIUM CARBONATE 300 MILLIGRAM(S): 300 TABLET, EXTENDED RELEASE ORAL at 10:13

## 2024-05-09 NOTE — BH INPATIENT PSYCHIATRY PROGRESS NOTE - NSBHCHARTREVIEWVS_PSY_A_CORE FT
Vital Signs Last 24 Hrs  T(C): 37.7 (05-08-24 @ 16:15), Max: 37.7 (05-08-24 @ 16:15)  T(F): 99.8 (05-08-24 @ 16:15), Max: 99.8 (05-08-24 @ 16:15)  HR: 85 (05-08-24 @ 16:15) (85 - 85)  BP: 146/84 (05-08-24 @ 16:15) (146/84 - 146/84)  BP(mean): --  RR: 187 (05-08-24 @ 16:15) (187 - 187)  SpO2: 97% (05-08-24 @ 16:15) (97% - 97%)     Vital Signs Last 24 Hrs  T(C): 37.2 (05-10-24 @ 08:56), Max: 37.2 (05-10-24 @ 08:56)  T(F): 99 (05-10-24 @ 08:56), Max: 99 (05-10-24 @ 08:56)  HR: 71 (05-10-24 @ 08:56) (71 - 71)  BP: 131/83 (05-10-24 @ 08:56) (131/83 - 131/83)  BP(mean): --  RR: 18 (05-10-24 @ 08:56) (18 - 18)  SpO2: 100% (05-10-24 @ 08:56) (100% - 100%)

## 2024-05-09 NOTE — BH INPATIENT PSYCHIATRY PROGRESS NOTE - NSBHASSESSSUMMFT_PSY_ALL_CORE
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 He is displaying elevated mood, grandiose ideation, impulsive behavior, flight of ideas, and complains of getting less than 5 hours of sleep per night since last Monday. He denies previous episodes of marine or hypomania. He recalls feeling depressed in the fall but denies taking any psychiatric medications. No substance use reported. He denies SI/HI/AVH.    Impression - likely manic episode without psychosis.    Substance use does not appear to be a contributing factor at this time. Although patient was displaying some signs of having grandiose delusions in the ED, currently is not exhibiting delusional thought process. No overt psychotic symptoms were observed on initial evaluation; his mood symptoms appear to be much more predominant, suggesting a diagnosis of bipolar I disorder rather than psychotic spectrum disorder at this time.     5/9 - Patient is overtly manic, displaying elevated mood, flight of ideas, pressured speech, and increased goal-directed activity. He slept for 7 hours last night. He interacted with other patients (watched a game on TV together). He has been keeping himself busy with writing and reading. No signs of psychosis or agitation noted. Patient was started on Lithium 300 mg and 600 mg daily and Zyprexa 5 mg at bedtime. Patient will be monitored for change in behavior and adverse effects from medications.     Plan:  - Lithium 300 mg PO in the morning and 600 mg PO at bedtime  - Zyprexa 5mg at bedtime for sleep  - PRN: PO Zyprexa 5 mg and Ativan 2 mg for agitation and anxiety, Trazodone 50 mg for insomnia, Ibuprofen 400 mg for pain

## 2024-05-09 NOTE — BH INPATIENT PSYCHIATRY PROGRESS NOTE - CURRENT MEDICATION
MEDICATIONS  (STANDING):  lithium 300 milliGRAM(s) Oral daily  lithium 600 milliGRAM(s) Oral at bedtime  OLANZapine 5 milliGRAM(s) Oral at bedtime    MEDICATIONS  (PRN):  ibuprofen  Tablet. 400 milliGRAM(s) Oral every 6 hours PRN Moderate Pain (4 - 6)  OLANZapine 5 milliGRAM(s) Oral every 8 hours PRN agitation  traZODone 50 milliGRAM(s) Oral at bedtime PRN insomnia   MEDICATIONS  (STANDING):  lithium 300 milliGRAM(s) Oral daily  lithium 600 milliGRAM(s) Oral at bedtime  OLANZapine 7.5 milliGRAM(s) Oral at bedtime    MEDICATIONS  (PRN):  ibuprofen  Tablet. 400 milliGRAM(s) Oral every 6 hours PRN Moderate Pain (4 - 6)  OLANZapine 5 milliGRAM(s) Oral every 8 hours PRN agitation  traZODone 50 milliGRAM(s) Oral at bedtime PRN insomnia

## 2024-05-09 NOTE — BH TREATMENT PLAN - NSCMSPTSTRENGTHS_PSY_ALL_CORE
Able to adapt/Assertive/Compliance to treatment/Future/goal oriented/Intact employment/Intelligence/Interpersonal skills/Leisure interest/Physically healthy/Positive attitude/Resourceful/Self confidence/Sense of Humor/Strong support system/Supportive family

## 2024-05-09 NOTE — BH INPATIENT PSYCHIATRY PROGRESS NOTE - PRN MEDS
MEDICATIONS  (PRN):  ibuprofen  Tablet. 400 milliGRAM(s) Oral every 6 hours PRN Moderate Pain (4 - 6)  OLANZapine 5 milliGRAM(s) Oral every 8 hours PRN agitation  traZODone 50 milliGRAM(s) Oral at bedtime PRN insomnia

## 2024-05-09 NOTE — BH INPATIENT PSYCHIATRY PROGRESS NOTE - NSBHMETABOLIC_PSY_ALL_CORE_FT
BMI: BMI (kg/m2): 22.4 (05-07-24 @ 13:28)  HbA1c: A1C with Estimated Average Glucose Result: 5.5 % (05-09-24 @ 05:30)    Glucose:   BP: 146/84 (05-08-24 @ 16:15) (138/88 - 146/84)Vital Signs Last 24 Hrs  T(C): 37.7 (05-08-24 @ 16:15), Max: 37.7 (05-08-24 @ 16:15)  T(F): 99.8 (05-08-24 @ 16:15), Max: 99.8 (05-08-24 @ 16:15)  HR: 85 (05-08-24 @ 16:15) (85 - 85)  BP: 146/84 (05-08-24 @ 16:15) (146/84 - 146/84)  BP(mean): --  RR: 187 (05-08-24 @ 16:15) (187 - 187)  SpO2: 97% (05-08-24 @ 16:15) (97% - 97%)      Lipid Panel: Date/Time: 05-09-24 @ 05:30  Cholesterol, Serum: 165  LDL Cholesterol Calculated: 74  HDL Cholesterol, Serum: 79  Total Cholesterol/HDL Ration Measurement: --  Triglycerides, Serum: 63   BMI: BMI (kg/m2): 22.4 (05-07-24 @ 13:28)  HbA1c: A1C with Estimated Average Glucose Result: 5.5 % (05-09-24 @ 05:30)    Glucose:   BP: 131/83 (05-10-24 @ 08:56) (126/89 - 146/84)Vital Signs Last 24 Hrs  T(C): 37.2 (05-10-24 @ 08:56), Max: 37.2 (05-10-24 @ 08:56)  T(F): 99 (05-10-24 @ 08:56), Max: 99 (05-10-24 @ 08:56)  HR: 71 (05-10-24 @ 08:56) (71 - 71)  BP: 131/83 (05-10-24 @ 08:56) (131/83 - 131/83)  BP(mean): --  RR: 18 (05-10-24 @ 08:56) (18 - 18)  SpO2: 100% (05-10-24 @ 08:56) (100% - 100%)      Lipid Panel: Date/Time: 05-09-24 @ 05:30  Cholesterol, Serum: 165  LDL Cholesterol Calculated: 74  HDL Cholesterol, Serum: 79  Total Cholesterol/HDL Ration Measurement: --  Triglycerides, Serum: 63

## 2024-05-09 NOTE — BH INPATIENT PSYCHIATRY PROGRESS NOTE - OTHER
Increased rate and productivity, near-pressured but interruptible The books have been showing him the meaning to everything in life

## 2024-05-09 NOTE — BH INPATIENT PSYCHIATRY PROGRESS NOTE - NSBHATTESTCOMMENTATTENDFT_PSY_A_CORE
I spoke to patient and patient's girlfriend and girlfriend's mother at length yesterday regarding our treatment plan, our treatment recommendations, and how he put in a 72 hour letter. Discussed how he has been cooperative with treatment here, has been polite towards team, and does not seem to meet criteria for conversion to involuntary status at this time. Although he is speaking rapidly (not pressured), has elevated mood and grandiosity, and family feels he is not at baseline. He has not required PRNs nor has he had any incidents with staff or peers. He has been attending groups. Provided psychoeducation regarding bipolar disorder, importance of sleep, when they should bring him back to the hospital or call an ambulance, when he should return to work, prognosis, and how long he should take meds for. Team and family asking pt to retract 72 hour letter but he seems pretty set in his decision. Spoke to pt and family for 1 hour and documented for 15 min.

## 2024-05-09 NOTE — BH INPATIENT PSYCHIATRY PROGRESS NOTE - NSBHFUPINTERVALHXFT_PSY_A_CORE
Patient's chart reviewed. No PRNs were given. No acute events overnight. Patient is medication adherent.  Upon approach, the patient was pacing in the room with a book in his hands. He greeted pleasantly and moved a chair for the interviewer and handed his journal. He had written over 12 pages of text in his journal describing his emotions and the events that occurred in the day prior. He said, "use this to help you understand how I have been doing". He reported having a great mood at the moment, and showed a graph in his journal tracking his mood for the past day. He reported having positive and productive thoughts. He read an entire book yesterday and eagerly spoke about how much he relates to the book. He had started to read his second book and assured that the first page of it gave him insight into what life is and how he can use this insight to get better and return to his normal life. He noted being irritable yesterday when his parents came to visit him to speak about his treatment instead of listening to him and his ideas. Later in the day his mood was elevated again when he watched a [employer] game on TV with other patients. He reported having slept from 11 pm to 6 am. Has eaten breakfast. No side effects from medications reported. Denies current AVH, SI, HI, PI.

## 2024-05-10 VITALS
OXYGEN SATURATION: 100 % | HEART RATE: 71 BPM | RESPIRATION RATE: 18 BRPM | DIASTOLIC BLOOD PRESSURE: 83 MMHG | TEMPERATURE: 99 F | SYSTOLIC BLOOD PRESSURE: 131 MMHG

## 2024-05-10 PROCEDURE — 83036 HEMOGLOBIN GLYCOSYLATED A1C: CPT

## 2024-05-10 PROCEDURE — 36415 COLL VENOUS BLD VENIPUNCTURE: CPT

## 2024-05-10 PROCEDURE — 84436 ASSAY OF TOTAL THYROXINE: CPT

## 2024-05-10 PROCEDURE — 99239 HOSP IP/OBS DSCHRG MGMT >30: CPT

## 2024-05-10 PROCEDURE — 84443 ASSAY THYROID STIM HORMONE: CPT

## 2024-05-10 PROCEDURE — 80061 LIPID PANEL: CPT

## 2024-05-10 RX ORDER — OLANZAPINE 15 MG/1
1 TABLET, FILM COATED ORAL
Qty: 30 | Refills: 0
Start: 2024-05-10 | End: 2024-06-08

## 2024-05-10 RX ORDER — LITHIUM CARBONATE 300 MG/1
1 TABLET, EXTENDED RELEASE ORAL
Qty: 30 | Refills: 0
Start: 2024-05-10 | End: 2024-06-08

## 2024-05-10 RX ADMIN — LITHIUM CARBONATE 300 MILLIGRAM(S): 300 TABLET, EXTENDED RELEASE ORAL at 10:04

## 2024-05-10 NOTE — BH DISCHARGE NOTE NURSING/SOCIAL WORK/PSYCH REHAB - NSCDUDCCRISIS_PSY_A_CORE
[facility] Well  1 [phone] [facility]-WELL ([phone])  Text "WELL" to 88737  Website: www.[employer]/.[place] Suicide Prevention Lifeline 9 (452) 794-8449/.  [facility] Behavioral Health Helpline / [facility] Mobile Crisis  (062) 583-DOHT (8418)/.  [facility] Response Crisis Hotline  (249) 805-5643  24 hour telephone crisis intervention and suicide prevention hotline concerned with all mental health issues/.  [facility]  [address] [place] [address] [phone]   Hours:  Monday through Friday from 9 AM to 3 PM/988 Suicide and Crisis Lifeline

## 2024-05-10 NOTE — BH INPATIENT PSYCHIATRY DISCHARGE NOTE - ATTENDING DISCHARGE PHYSICAL EXAMINATION:
MSE- Well groomed, irritable today about being discharged too late in the day, but fairly related, good EC. -PMR/A Speech: rapid but not pressured Mood: "I'm fine." Affect" elevated, irritable about being discharged late TP: linear TC: -SI/HI/AH/VH. +mild PI. +grandiosity I&J: fair

## 2024-05-10 NOTE — BH INPATIENT PSYCHIATRY DISCHARGE NOTE - HPI (INCLUDE ILLNESS QUALITY, SEVERITY, DURATION, TIMING, CONTEXT, MODIFYING FACTORS, ASSOCIATED SIGNS AND SYMPTOMS)
Patient is a 25yo  male, domiciled with girlfriend in , employed as HS teacher, with no known PMH, no known prior PPH, no history of significant substance use, who initially reported to [facility] ED for elevated mood, decreased sleep, racing thoughts, flight of ideas, grandiose delusions, and significant change in behavior. Utox negative. He was given one dose of Zyprexa 5 mg and was transferred to [facility] for further evaluation and treatment.     Upon approach, patient was pleasant, cooperative, and talkative. He was sitting in his room, multiple pages filled with his writing around him. His main complaint at this time is his unusually elevated mood/energy, lack of sleep, hyperproductivity, and impulsive actions (ex: buying tickets to a [employer] game day of) for the past few weeks. The patient noticed a gradual change in his mood starting about 2 months ago, with more abrupt, drastic increase in symptoms beginning 2 weeks ago. He reports getting <5 hours of sleep/night since last Monday (recalls waking his girlfriend up and calling his parents to talk about his ideas at 1 am). He describes having increased levels of energy, feeling like he is the best teacher at his work, taking on more responsibilities at work, and completing all his projects on time ("This is the best I've ever been at my job"). Reported consistently elevated mood over the past two weeks. Denies any history of AVH, SI, HI. Denied any history of substance use outside of occasional recreational alcohol use and nicotine in HS/college. Patient stated he would be willing to comply with treatment and do "whatever it takes to get better"; however he would like to be discharged as soon as possible and has submitted a 72-hour letter.     Patient stated this was his first time seeking psychiatric treatment and that he has never been on psychiatric medications or received an official diagnosis. He mentioned feeling somewhat depressed and anxious in the fall, when he had just started working as a . He described feeling like he was not good enough for the job and had a difficult time adjusting, often questioning if he was doing the right thing. Denied any history of manic symptoms prior to this episode. Reported having a maternal uncle with schizophrenia. Denied having any medical conditions, being on any medications prior to admission, or having any known drug allergies. Denied having any legal history.    Collateral was obtained from Stephen (patient's father and mother, [phone]).  Parents confirmed the patient's current symptoms and presentation. They described his baseline as a pleasant, caring, charismatic, outgoing individual, who plays sports and has future-oriented goals of getting engaged. They live close by and see the patient 1-2x a week. They reported that for the past few weeks, the patient's behavior has been noticeably different from usual (talking more, being overly ambitious). He also has been calling them in the middle of the night. However he has not engaged in any highly risky behavior to their knowledge. Also denied knowledge of any substance use.     When asked about potential stressors, mother reported that she has stage 4 cancer; patient's girlfriend's mother (who patient is very close to) was also diagnosed with cancer around the same time. 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 Said that patient has seen a psychologist due to distress over family conflicts but otherwise received no formal diagnosis.     Counseling around patient's likely diagnosis of bipolar disorder, prognosis, and treatment options were provided to the family.

## 2024-05-10 NOTE — BH INPATIENT PSYCHIATRY DISCHARGE NOTE - HOSPITAL COURSE
Patient presented with symptoms of acute manic episode, including rapid, near-pressured speech, elevated mood, flight of ideas, decreased sleep, and grandiosity. Utox and labs unremarkable (CBC/CMP WNL, TSH 1.94, T4 6.79, A1C 5.5). He was started on Zyprexa 5 mg nightly (increased to 7.5 mg nightly), as well as lithium 300 mg AM + 600 mg PM. On the unit, he remained in behavioral control; no behavioral codes or PRN medications for agitation given. He has been adherent to all medications prescribed and cooperative with treatment. Has consistently denied SI/HI, been non-aggressive, and has not exhibited any psychotic symptoms. His sleep has improved significantly since starting Zyprexa (now averaging 7 hours of sleep/night). On day 1 of admission (5/8/24), he submitted a 72-hour letter requesting release; as patient was determined to not be at acute risk of harming himself or others and able to safely care for himself in community, he did not meet criteria for involuntary psychiatric admission and will be discharged today.    He will be discharged on a 30-day supply of the following, which he should continue taking until instructed otherwise by his outpatient psychiatrist:  Lithium 300 mg AM + 600 mg PM   Zyprexa 7.5 mg nightly    Pharmacological substance use treatment was not offered as patient does not use substances.      Patient presented with symptoms of acute manic episode, including rapid, near-pressured speech, elevated mood, flight of ideas, decreased sleep, and grandiosity. Utox and labs unremarkable (CBC/CMP WNL, TSH 1.94, T4 6.79, A1C 5.5). He was started on Zyprexa 5 mg nightly (increased to 7.5 mg nightly), as well as lithium 300 mg AM + 600 mg PM. On the unit, he remained in behavioral control; no behavioral codes or PRN medications for agitation given. He has been adherent to all medications prescribed and cooperative with treatment. Has consistently denied SI/HI, been non-aggressive, and has not exhibited any psychotic symptoms. His sleep has improved significantly since starting Zyprexa (now averaging 7 hours of sleep/night). On day 1 of admission (5/8/24), he submitted a 72-hour letter requesting release; as patient was determined to not be at acute risk of harming himself or others and able to safely care for himself in community, he did not meet criteria for involuntary psychiatric admission and will be discharged today.    He will be discharged on a 30-day supply of the following, which he should continue taking until instructed otherwise by his outpatient psychiatrist:  Lithium 300 mg AM + 600 mg PM, to start on 5/10  Zyprexa 7.5 mg nightly, to start on 5/10    Pharmacological substance use treatment was not offered as patient does not use substances.

## 2024-05-10 NOTE — BH SAFETY PLAN - WARNING SIGN 1
Pt completed a safety plan and received a copy to take with him. An additional copy has been filed in Pt's chart on the unit.

## 2024-05-10 NOTE — BH INPATIENT PSYCHIATRY DISCHARGE NOTE - DESCRIPTION
No Pt is a 24-year-old male, who was born and grew up in [place]. He lives with his girlfriend Torie Matthew. 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 He drinks socially. No other substance use reported.

## 2024-05-10 NOTE — BH DISCHARGE NOTE NURSING/SOCIAL WORK/PSYCH REHAB - NSDCPRGOAL_PSY_ALL_CORE
Pt has declined all offered groups during this hospitalization. Pt's presentation has varied from superficially pleasant and calm in select interaction and then very paranoid and perseverative at other times. Pt demonstrates paranoia about the purpose of this unit and why he was hospitalized here, some paranoid thinking directed toward the unit chief as well as the higher "keith that be." Pt reports that journaling has been a helpful coping strategy to him and endorses having a social support team of family and friends he intends to utilize for support with mental health. Pt demonstrates low insight around current paranoid ideation but has been able to maintain behavioral control and engage more appropriately, and better related when calmer. Pt discharge focused, cooperative in safety planning, and received a copy of the safety plan to take with him.

## 2024-05-10 NOTE — BH DISCHARGE NOTE NURSING/SOCIAL WORK/PSYCH REHAB - NSDCADDINFO1FT_PSY_ALL_CORE
Intake appointment scheduled for Thursday, May 16th at 10:40am. This is a VIRTUAL appointment (with option for in-person). Please complete intake forms sent to email.  Intake appointment scheduled for Thursday, May 16th at 10:40am. This is a VIRTUAL appointment (with option for in-person). Please complete intake forms sent to email and upload insurance card.

## 2024-05-10 NOTE — BH INPATIENT PSYCHIATRY DISCHARGE NOTE - NSDCMRMEDTOKEN_GEN_ALL_CORE_FT
lithium 300 mg oral capsule: 1 cap(s) orally once a day  lithium 600 mg oral capsule: 1 cap(s) orally once a day (at bedtime)  OLANZapine 7.5 mg oral tablet: 1 tab(s) orally once a day (at bedtime)

## 2024-05-10 NOTE — BH INPATIENT PSYCHIATRY DISCHARGE NOTE - REASON FOR ADMISSION
Patient is a 24 year old male who presented to the ED with unusually elevated mood/energy, lack of sleep, hyper productivity, and impulsivity. He was admitted for acute manic episode.

## 2024-05-10 NOTE — BH DISCHARGE NOTE NURSING/SOCIAL WORK/PSYCH REHAB - PATIENT PORTAL LINK FT
You can access the FollowMyHealth Patient Portal offered by [facility] by registering at the following website: [email or web address]. By joining [employer]’s FollowMyHealth portal, you will also be able to view your health information using other applications (apps) compatible with our system.

## 2024-05-10 NOTE — BH INPATIENT PSYCHIATRY DISCHARGE NOTE - NSBHASSESSSUMMFT_PSY_ALL_CORE
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 He is displaying elevated mood, grandiose ideation, impulsive behavior, flight of ideas, and complains of getting less than 5 hours of sleep per night since last Monday. He denies previous episodes of marine or hypomania. He recalls feeling depressed in the fall but denies taking any psychiatric medications. No substance use reported. He denies SI/HI/AVH.    Impression - likely manic episode without psychosis.    Substance use does not appear to be a contributing factor at this time. Although patient was displaying some signs of having grandiose delusions in the ED, currently is not exhibiting delusional thought process. No overt psychotic symptoms were observed on initial evaluation; his mood symptoms appear to be much more predominant, suggesting a diagnosis of bipolar I disorder rather than psychotic spectrum disorder at this time.

## 2024-05-10 NOTE — BH INPATIENT PSYCHIATRY DISCHARGE NOTE - OTHER PAST PSYCHIATRIC HISTORY (INCLUDE DETAILS REGARDING ONSET, COURSE OF ILLNESS, INPATIENT/OUTPATIENT TREATMENT)
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

## 2024-05-10 NOTE — BH SOCIAL WORK CONFIRMATION FOLLOW UP NOTE - NSCOMMENTS_PSY_ALL_CORE
Caring Call 5/10/24: Pt low SI risk at admission thus, no call needed.     Linkage Call 5/16/24:     Aftercare Appointment  [facility]  16-May-2024 10:40  [address] [place] [address] [phone]  Mental Health Treatment  Intake appointment scheduled for Thursday, May 16th at 10:40am. This is a VIRTUAL appointment (with option for in-person). Please complete intake forms sent to email and upload insurance card.   Caring Call 5/10/24: Pt low SI risk at admission thus, no call needed.     Linkage Call 5/16/24:     Aftercare Appointment  [facility]  16-May-2024 10:40  [address] [place] [address] [phone]  Mental Health Treatment  Intake appointment scheduled for Thursday, May 16th at 10:40am. This is a VIRTUAL appointment (with option for in-person). Please complete intake forms sent to email and upload insurance card.  ---  LINKAGE CALL [[id number]] @ [[id number]] SOCO called [[facility]] on [05/16/2024] to verify if patient attended appointment on [05/16/2024]. SOCO confirmed patient attended outpatient appointment and has scheduled his second follow up appointment.

## 2024-05-10 NOTE — BH DISCHARGE NOTE NURSING/SOCIAL WORK/PSYCH REHAB - NSDCCRTYPESERV_GEN_ALL_CORE_FT
Offer essential mental health counseling, addiction treatment, and crisis care for children and adults.

## 2024-05-10 NOTE — BH INPATIENT PSYCHIATRY DISCHARGE NOTE - NSBHMETABOLIC_PSY_ALL_CORE_FT
BMI: BMI (kg/m2): 22.4 (05-07-24 @ 13:28)  HbA1c: A1C with Estimated Average Glucose Result: 5.5 % (05-09-24 @ 05:30)    Glucose:   BP: 131/83 (05-10-24 @ 08:56) (126/89 - 146/84)Vital Signs Last 24 Hrs  T(C): 37.2 (05-10-24 @ 08:56), Max: 37.2 (05-10-24 @ 08:56)  T(F): 99 (05-10-24 @ 08:56), Max: 99 (05-10-24 @ 08:56)  HR: 71 (05-10-24 @ 08:56) (71 - 71)  BP: 131/83 (05-10-24 @ 08:56) (131/83 - 131/83)  BP(mean): --  RR: 18 (05-10-24 @ 08:56) (18 - 18)  SpO2: 100% (05-10-24 @ 08:56) (100% - 100%)      Lipid Panel: Date/Time: 05-09-24 @ 05:30  Cholesterol, Serum: 165  LDL Cholesterol Calculated: 74  HDL Cholesterol, Serum: 79  Total Cholesterol/HDL Ration Measurement: --  Triglycerides, Serum: 63

## 2024-05-10 NOTE — BH INPATIENT PSYCHIATRY DISCHARGE NOTE - NSBHDCRISKMITIGATE_PSY_ALL_CORE
Safety planning/Family/Other social support involvement Safety planning/Family/Other social support involvement/Medications targeting suicidality/non-suicidal self injurious behavior

## 2024-05-10 NOTE — BH INPATIENT PSYCHIATRY DISCHARGE NOTE - NSBHFUPINTERVALHXFT_PSY_A_CORE
Patient more irritable today, continues to exhibit rapid, hyperverbal speech and flight of ideas. However has remained in behavioral control on the unit, no PRN medications for agitation required. Reports that he is extremely unhappy with not being discharged sooner and with the care he and others received on the unit, and that he "smells a malpractice suit brewing." He reports that he intends on going to all follow up psychiatric appointments after discharge and to continue taking his medications, which he feels have been helpful. Will also call out of work for the next two weeks to monitor his symptoms. Denies any side effects. Reports improvement in sleep (7.5 hrs last night). Denies SI/HI, AH/VH.

## 2024-05-11 ENCOUNTER — TRANSCRIPTION ENCOUNTER (OUTPATIENT)
Age: 24
End: 2024-05-11

## 2024-05-16 DIAGNOSIS — Z81.8 FAMILY HISTORY OF OTHER MENTAL AND BEHAVIORAL DISORDERS: ICD-10-CM

## 2024-05-16 DIAGNOSIS — F31.9 BIPOLAR DISORDER, UNSPECIFIED: ICD-10-CM

## 2024-05-16 DIAGNOSIS — F30.2 MANIC EPISODE, SEVERE WITH PSYCHOTIC SYMPTOMS: ICD-10-CM

## 2024-05-16 DIAGNOSIS — F41.9 ANXIETY DISORDER, UNSPECIFIED: ICD-10-CM

## 2024-09-21 ENCOUNTER — NON-APPOINTMENT (OUTPATIENT)
Age: 24
End: 2024-09-21